# Patient Record
Sex: FEMALE | Race: WHITE | HISPANIC OR LATINO
[De-identification: names, ages, dates, MRNs, and addresses within clinical notes are randomized per-mention and may not be internally consistent; named-entity substitution may affect disease eponyms.]

---

## 2020-08-07 ENCOUNTER — TRANSCRIPTION ENCOUNTER (OUTPATIENT)
Age: 20
End: 2020-08-07

## 2023-03-09 ENCOUNTER — NON-APPOINTMENT (OUTPATIENT)
Age: 23
End: 2023-03-09

## 2023-03-09 PROBLEM — Z00.00 ENCOUNTER FOR PREVENTIVE HEALTH EXAMINATION: Status: ACTIVE | Noted: 2023-03-09

## 2023-03-13 ENCOUNTER — NON-APPOINTMENT (OUTPATIENT)
Age: 23
End: 2023-03-13

## 2023-03-13 ENCOUNTER — APPOINTMENT (OUTPATIENT)
Dept: NEUROSURGERY | Facility: CLINIC | Age: 23
End: 2023-03-13
Payer: MEDICARE

## 2023-03-13 VITALS
TEMPERATURE: 98.3 F | SYSTOLIC BLOOD PRESSURE: 129 MMHG | HEIGHT: 66 IN | BODY MASS INDEX: 34.55 KG/M2 | OXYGEN SATURATION: 97 % | HEART RATE: 82 BPM | WEIGHT: 215 LBS | DIASTOLIC BLOOD PRESSURE: 87 MMHG

## 2023-03-13 PROCEDURE — 99205 OFFICE O/P NEW HI 60 MIN: CPT

## 2023-03-15 NOTE — ASSESSMENT
[FreeTextEntry1] : 2023\par \par Deshawn Garcia MD\par [address] [facility], [address]\[place] [address]\par \par Re:	Zahra Jones\par :	2000\par \par Dear Dr. Garcia:\par \par I saw our mutual patient, Zahra Jones, today in neurosurgical consultation.  She was seen by one of the NPs in your office.  She is a 23-year-old right-handed female who obtained an MRA of the [place] secondary to family history.  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  \par \par Her past medical history is negative.  She has some sinus issues.  Past surgical history is negative.  She is allergic to amoxicillin and penicillin, and she has a strong family history.  She lost her father to an aneurysmal subarachnoid hemorrhage in 2022 at age 50, and also her father’s sister, her aunt, had a subarachnoid hemorrhage.  The patient is on no medications.  She works in an office, and she smoked cigarettes until recently.  She has a 4-year history of smoking.  She is completely neurologically intact on examination.\par \par IMPRESSION: I reviewed the films carefully, and there is a tiny aneurysm just where the right carotid enters the subarachnoid compartment.  It certainly should be managed conservatively based on its size and location despite the family history.  It is imperative that she follow on a yearly basis with MRAs.  If there is any change whatsoever in the size, configuration, or shape of this lesion, given the family history, she would be a candidate for a flow-diverting stent in this region and definitive treatment of the aneurysm in that manner.  I will see her in one year’s time in follow-up.  \par \par I appreciate the confidence and courtesy of this referral.\par \par Sincerely,\par \par \par \par Albert Riley M.D., F.A.C.S.\par [facility]\par \par \par \par

## 2024-03-27 ENCOUNTER — OFFICE (OUTPATIENT)
Dept: URBAN - METROPOLITAN AREA CLINIC 100 | Facility: CLINIC | Age: 24
Setting detail: OPHTHALMOLOGY
End: 2024-03-27
Payer: COMMERCIAL

## 2024-03-27 DIAGNOSIS — H52.13: ICD-10-CM

## 2024-03-27 DIAGNOSIS — H40.013: ICD-10-CM

## 2024-03-27 PROCEDURE — 92014 COMPRE OPH EXAM EST PT 1/>: CPT | Performed by: OPHTHALMOLOGY

## 2024-03-27 PROCEDURE — 92133 CPTRZD OPH DX IMG PST SGM ON: CPT | Performed by: OPHTHALMOLOGY

## 2024-03-27 PROCEDURE — 92015 DETERMINE REFRACTIVE STATE: CPT | Performed by: OPHTHALMOLOGY

## 2024-03-27 ASSESSMENT — REFRACTION_MANIFEST
OD_AXIS: 145
OS_AXIS: 160
OS_AXIS: 160
OS_SPHERE: -1.00
OU_VA: 20/20
OU_VA: 20/20
OD_CYLINDER: -0.50
OS_CYLINDER: -0.50
OD_CYLINDER: -0.25
OD_SPHERE: -2.50
OD_AXIS: 170
OS_CYLINDER: -0.50
OS_VA1: 20/20-1
OD_VA1: 20/20
OD_CYLINDER: -0.25
OS_VA1: 20/20
OS_SPHERE: -1.00
OD_SPHERE: -2.25
OS_AXIS: 160
OS_CYLINDER: -0.50
OS_VA1: 20/20-1
OD_VA1: 20/20-1
OD_AXIS: 145
OS_SPHERE: -1.00
OD_SPHERE: -2.25
OD_VA1: 20/20-1

## 2024-03-27 ASSESSMENT — SPHEQUIV_DERIVED
OS_SPHEQUIV: -1.25
OS_SPHEQUIV: -1.25
OD_SPHEQUIV: -2.375
OD_SPHEQUIV: -2.375
OD_SPHEQUIV: -2.75
OS_SPHEQUIV: -1.25

## 2024-03-27 ASSESSMENT — REFRACTION_CURRENTRX
OS_SPHERE: -0.75
OD_OVR_VA: 20/
OD_AXIS: 155
OS_VPRISM_DIRECTION: SV
OD_SPHERE: -2.25
OS_OVR_VA: 20/
OD_CYLINDER: -0.25
OS_CYLINDER: -0.50
OD_VPRISM_DIRECTION: SV
OS_AXIS: 165